# Patient Record
Sex: FEMALE | Race: AMERICAN INDIAN OR ALASKA NATIVE | NOT HISPANIC OR LATINO | URBAN - METROPOLITAN AREA
[De-identification: names, ages, dates, MRNs, and addresses within clinical notes are randomized per-mention and may not be internally consistent; named-entity substitution may affect disease eponyms.]

---

## 2020-12-08 ENCOUNTER — OFFICE VISIT (OUTPATIENT)
Dept: URBAN - METROPOLITAN AREA CLINIC 12 | Facility: CLINIC | Age: 55
End: 2020-12-08
Payer: MEDICARE

## 2020-12-08 DIAGNOSIS — A04.72 CLOSTRIDIUM DIFFICILE COLITIS: ICD-10-CM

## 2020-12-08 DIAGNOSIS — D50.8 OTHER IRON DEFICIENCY ANEMIA: ICD-10-CM

## 2020-12-08 DIAGNOSIS — I78.0 HHT (HEREDITARY HEMORRHAGIC TELANGIECTASIA): ICD-10-CM

## 2020-12-08 PROCEDURE — 99244 OFF/OP CNSLTJ NEW/EST MOD 40: CPT | Performed by: INTERNAL MEDICINE

## 2020-12-08 PROCEDURE — 99204 OFFICE O/P NEW MOD 45 MIN: CPT | Performed by: INTERNAL MEDICINE

## 2020-12-08 RX ORDER — VANCOMYCIN HYDROCHLORIDE 125 MG/1
1 CAPSULE CAPSULE ORAL
Qty: 16 CAPSULE | Refills: 0

## 2020-12-08 RX ORDER — FENTANYL 25 UG/H
1 PATCH TO SKIN PATCH TRANSDERMAL
Status: ACTIVE | COMMUNITY

## 2020-12-08 NOTE — HPI-TODAY'S VISIT:
was admitted to [facility] in october with abdominal pain and diarrhea- was found to have c diff.  was started on vancomycin. ct had shown pan colitis.  -she has a history of HHT and is followed at [facility]. has history of recurrent nosebleeds and possibly gi bleeds.   -also sees pain management at [facility] -her primary gi is actually in [place], not at [facility] as documented in notes from Dr. drummond at [facility], this is why she has made an appointment to establish care  -she was also found to be anemic while in the hospital . her hg at that time was back to 11.9.   stool studies were inconclusive for c diff. she was given another rx for vancomycin- she states that she just started it on friday . she states that she was written for 10 days for this.  she states that she is still not feeling great.  she is eating less solid foods.  she states that she is trying to get more pedialyte.  she states that her bowel mvoements are still loose /with gas but having less amount than before.  she states that the diarrhea is decreasing.  she is avoiding food though  -she started having more diarrhea again last week, nausea -she feels that her abdominal pain is improved -she has had to have 10 transfusions in the past 10 months due to the namiea.  has angiectasia in he gi tact. has not seen blood in tucker stool or black stool.   with covid she has been doing it as needed  -she states that her last colonoscopy in 2015 - she was advised to return in 2 years due to polypsf -sshe hasn't made a f/u colonoscopy due to signifiacnt concernfear of doing  the procedure due to hht

## 2020-12-23 ENCOUNTER — OFFICE VISIT (OUTPATIENT)
Dept: URBAN - METROPOLITAN AREA CLINIC 12 | Facility: CLINIC | Age: 55
End: 2020-12-23

## 2020-12-23 RX ORDER — VANCOMYCIN HYDROCHLORIDE 125 MG/1
1 CAPSULE CAPSULE ORAL
Qty: 16 CAPSULE | Refills: 0

## 2020-12-23 NOTE — HPI-TODAY'S VISIT:
was admitted to [facility] in october with abdominal pain and diarrhea- was found to have c diff.  was started on vancomycin. ct had shown pan colitis.  -she has a history of HHT and is followed at [facility]. has history of recurrent nosebleeds and possibly gi bleeds.   -also sees pain management at [facility] -her primary gi is actually in [place], not at [facility] as documented in notes from Dr. drummond at [facility], this is why she has made an appointment to establish care  -she was also found to be anemic while in the hospital . her hg at that time was back to 11.9.   stool studies were inconclusive for c diff. she was given another rx for vancomycin- she states that she just started it on friday . she states that she was written for 10 days for this.  she states that she is still not feeling great.  she is eating less solid foods.  she states that she is trying to get more pedialyte.  she states that her bowel mvoements are still loose /with gas but having less amount than before.  she states that the diarrhea is decreasing.  she is avoiding food though  -she started having more diarrhea again last week, nausea -she feels that her abdominal pain is improved -she has had to have 10 transfusions in the past 10 months due to the namiea.  has angiectasia in he gi tact. has not seen blood in tucker stool or black stool.   with covid she has been doing it as needed  -she states that her last colonoscopy in 2015 - she was advised to return in 2 years due to polypsf -sshe hasn't made a f/u colonoscopy due to signifiacnt concernfear of doing  the procedure due to hht

## 2021-01-28 PROBLEM — 271737000 ANEMIA: Status: ACTIVE | Noted: 2020-12-08

## 2023-02-23 ENCOUNTER — OUT OF OFFICE VISIT (OUTPATIENT)
Dept: URBAN - NONMETROPOLITAN AREA MEDICAL CENTER 3 | Facility: MEDICAL CENTER | Age: 58
End: 2023-02-23
Payer: MEDICARE

## 2023-02-23 DIAGNOSIS — R10.10 ABDOMINAL WALL PAIN IN RIGHT UPPER QUADRANT: ICD-10-CM

## 2023-02-23 DIAGNOSIS — R11.0 AM NAUSEA: ICD-10-CM

## 2023-02-23 DIAGNOSIS — R93.3 ABN FINDINGS-GI TRACT: ICD-10-CM

## 2023-02-23 PROCEDURE — 99232 SBSQ HOSP IP/OBS MODERATE 35: CPT | Performed by: REGISTERED NURSE

## 2023-02-23 PROCEDURE — 99222 1ST HOSP IP/OBS MODERATE 55: CPT | Performed by: REGISTERED NURSE

## 2023-02-23 PROCEDURE — G8427 DOCREV CUR MEDS BY ELIG CLIN: HCPCS | Performed by: REGISTERED NURSE

## 2023-03-01 ENCOUNTER — OFFICE VISIT (OUTPATIENT)
Dept: URBAN - METROPOLITAN AREA CLINIC 12 | Facility: CLINIC | Age: 58
End: 2023-03-01
Payer: MEDICARE

## 2023-03-01 ENCOUNTER — DASHBOARD ENCOUNTERS (OUTPATIENT)
Age: 58
End: 2023-03-01

## 2023-03-01 VITALS
SYSTOLIC BLOOD PRESSURE: 169 MMHG | TEMPERATURE: 98.1 F | BODY MASS INDEX: 20.26 KG/M2 | DIASTOLIC BLOOD PRESSURE: 93 MMHG | HEART RATE: 82 BPM | WEIGHT: 136.8 LBS | HEIGHT: 69 IN

## 2023-03-01 DIAGNOSIS — M54.9 DORSALGIA, UNSPECIFIED: ICD-10-CM

## 2023-03-01 DIAGNOSIS — I78.0 HHT (HEREDITARY HEMORRHAGIC TELANGIECTASIA): ICD-10-CM

## 2023-03-01 DIAGNOSIS — G89.29 OTHER CHRONIC PAIN: ICD-10-CM

## 2023-03-01 DIAGNOSIS — D50.9 IRON DEFICIENCY ANEMIA: ICD-10-CM

## 2023-03-01 DIAGNOSIS — I48.91 ATRIAL FIBRILLATION, UNSPECIFIED TYPE: ICD-10-CM

## 2023-03-01 DIAGNOSIS — R93.89 ABNORMAL CT SCAN: ICD-10-CM

## 2023-03-01 PROBLEM — 82423001: Status: ACTIVE | Noted: 2023-03-01

## 2023-03-01 PROBLEM — 129679001: Status: ACTIVE | Noted: 2023-03-01

## 2023-03-01 PROBLEM — 49436004 ATRIAL FIBRILLATION: Status: ACTIVE | Noted: 2023-03-01

## 2023-03-01 PROBLEM — 21877004 OSLER HEMORRHAGIC TELANGIECTASIA SYNDROME: Status: ACTIVE | Noted: 2020-12-08

## 2023-03-01 PROBLEM — 87522002 IRON DEFICIENCY ANEMIA: Status: ACTIVE | Noted: 2020-12-08

## 2023-03-01 PROCEDURE — 99204 OFFICE O/P NEW MOD 45 MIN: CPT | Performed by: INTERNAL MEDICINE

## 2023-03-01 RX ORDER — VANCOMYCIN HYDROCHLORIDE 125 MG/1
1 CAPSULE CAPSULE ORAL
Qty: 16 CAPSULE | Refills: 0 | Status: DISCONTINUED | COMMUNITY

## 2023-03-01 RX ORDER — CEFDINIR 300 MG/1
AS DIRECTED CAPSULE ORAL
Status: ACTIVE | COMMUNITY

## 2023-03-01 RX ORDER — ALPRAZOLAM 1 MG/1
1 TABLET TABLET ORAL TWICE A DAY
Status: ACTIVE | COMMUNITY

## 2023-03-01 RX ORDER — ONDANSETRON 4 MG/1
1 TABLET ON THE TONGUE AND ALLOW TO DISSOLVE TABLET, ORALLY DISINTEGRATING ORAL ONCE A DAY
Status: ACTIVE | COMMUNITY

## 2023-03-01 RX ORDER — FENTANYL 25 UG/H
1 PATCH TO SKIN PATCH TRANSDERMAL
Status: DISCONTINUED | COMMUNITY

## 2023-03-01 RX ORDER — FENTANYL 25 UG/H
1 PATCH TO SKIN PATCH TRANSDERMAL
Status: ACTIVE | COMMUNITY

## 2023-03-01 RX ORDER — OXYCODONE HYDROCHLORIDE 30 MG/1
AS DIRECTED TABLET ORAL
Status: ACTIVE | COMMUNITY

## 2023-03-01 NOTE — HPI-TODAY'S VISIT:
58 yo female presening for evalation for abnormal ct, abdominal pain referred by Dr. Deshawn Bertrand . A copy of this note will be sent to the referring physician.  -pain started on 2/21- was admitted to [facility] with upper abdominal pain and nausea.  states that thsi pain is worse than anything she has had before.  -had ct scan which showed splenic flexure, left sided colitis and short intessuception in the transverse colon.   -she was sen by general surgeon and monitored but didn't end up having surgery  -repors that she hasn't had a bm since discharge.  she is having minimal pain, discomfort, about a 3-4 on the scale of 10 .   -she was discharged on antibiotics- still taking cefdinir  -she is switching GI practices from [facility], it is unclear.  she reports that she has had some colonoscopy and endoscopy.  these needed to be done to control bleeding, had polyps removed.  over 5 years for both procedures.  -reprots that she has a hx of panc insufficiency/atrophy that is related to the hht and chronic pancreatitis -has known hx of ongoing gi bleed due to HHT.   -her ardiologist was Dr. Livingston with [facility]

## 2023-03-03 ENCOUNTER — TELEPHONE ENCOUNTER (OUTPATIENT)
Dept: URBAN - METROPOLITAN AREA CLINIC 12 | Facility: CLINIC | Age: 58
End: 2023-03-03

## 2023-04-26 ENCOUNTER — TELEPHONE ENCOUNTER (OUTPATIENT)
Dept: URBAN - METROPOLITAN AREA CLINIC 12 | Facility: CLINIC | Age: 58
End: 2023-04-26

## 2023-05-01 ENCOUNTER — OFFICE VISIT (OUTPATIENT)
Dept: URBAN - NONMETROPOLITAN AREA CLINIC 4 | Facility: CLINIC | Age: 58
End: 2023-05-01

## 2023-05-11 ENCOUNTER — OFFICE VISIT (OUTPATIENT)
Dept: URBAN - METROPOLITAN AREA MEDICAL CENTER 27 | Facility: MEDICAL CENTER | Age: 58
End: 2023-05-11